# Patient Record
Sex: FEMALE | Race: BLACK OR AFRICAN AMERICAN
[De-identification: names, ages, dates, MRNs, and addresses within clinical notes are randomized per-mention and may not be internally consistent; named-entity substitution may affect disease eponyms.]

---

## 2017-07-04 ENCOUNTER — HOSPITAL ENCOUNTER (EMERGENCY)
Dept: HOSPITAL 10 - FTE | Age: 67
Discharge: HOME | End: 2017-07-04
Payer: COMMERCIAL

## 2017-07-04 VITALS
HEIGHT: 65 IN | BODY MASS INDEX: 24.98 KG/M2 | HEIGHT: 65 IN | BODY MASS INDEX: 24.98 KG/M2 | WEIGHT: 149.91 LBS | WEIGHT: 149.91 LBS

## 2017-07-04 DIAGNOSIS — I10: ICD-10-CM

## 2017-07-04 DIAGNOSIS — H10.9: Primary | ICD-10-CM

## 2017-07-04 PROCEDURE — 99283 EMERGENCY DEPT VISIT LOW MDM: CPT

## 2017-07-04 NOTE — ERD
ER Documentation


Chief Complaint


Date/Time


DATE: 7/4/17 


TIME: 08:54


Chief Complaint


rt eye reddness and discharge since this am





HPI


66-year-old female complaining of right eye redness and discharge since this 

morning.  Patient reports her right eye was glued shut this morning upon awake.

  Patient reports a brief episode of slight high pain last night.  Patient 

stated that she has history of glaucoma, and have occasional eye pains 

bilaterally.  The eye pain history is similar to previous eye pains.  She uses 

Lumigan and Cosopt drops for her glaucoma, and had not used them this morning.  

Patient also has history of hypertension, acid reflux, overactive bladder 

syndrome, and allergic rhinitis.  Denies eye pain at this time.  Denies 

photophobia.  Denies nausea or vomiting.  Denies blurry vision.





ROS


All systems reviewed and are negative except as per history of present illness.





Medications


Home Meds


Active Scripts


Polymyxin B Sulfate-TMP* (Polymyxin B-TMP Eye Drops*) 10 Ml Drops, 1 DROP RIGHT 

EYE QID for 7 Days, EA


   Prov:SHLOMO KAUR NP         7/4/17





Allergies


Allergies:  


Coded Allergies:  


     No Known Allergy (Unverified , 7/4/17)





PMhx/Soc


History of Surgery:  Yes (b/l eye surgery )


Anesthesia Reaction:  No


Hx Neurological Disorder:  No


Hx Respiratory Disorders:  No


Hx Cardiac Disorders:  Yes (htn)


Hx Psychiatric Problems:  No


Hx Miscellaneous Medical Probl:  Yes (glaucoma )


Hx Alcohol Use:  No


Hx Substance Use:  No


Hx Tobacco Use:  No


Smoking Status:  Never smoker





Physical Exam


Vitals





Vital Signs








  Date Time  Temp Pulse Resp B/P Pulse Ox O2 Delivery O2 Flow Rate FiO2


 


7/4/17 07:20 98.2 62 18 137/70 99   








Physical Exam


General:    Well-developed, well-nourished, conscious and coherent, in no 

distress


Skin:    Warm and dry without rash, good texture and turgor


Head:    Normocephalic without evidence of trauma


Eyes:    Sclera normal, right conjunctiva injected, with moderate discharge; 

pupils equal, round, and reactive to light; extraocular movements are intact


Neck:    Supple without meningismus or adenopathy.  Carotids are equal.  

Trachea midline.  No bruits or JVD


Chest:    Normal AP diameter.  Good expansion without retractions.  Nontender.  

Lungs are clear to auscultate bilaterally with good tidal volume


Heart:    Regular rate and rhythm.  No murmur, rub, or gallops heard


Extremities:    Full range of motion.  Good strength bilaterally.  No clubbing, 

cyanosis, or edema. Peripheral pulses are intact. Sensation intact


Neuro:    Alert and oriented 4, GCS 15.  Cranial nerves grossly intact.  Motor 

and sensory exams nonfocal.  Moves all extremities.  Speech clear.  Gait normal





Procedures/MDM


Well-appearing 66-year-old female with history of alcohol, presented to ED with 

right eye redness 1 day.





Visual acuity, corrected with glasses: Left 20/25, right 20/25, bilateral 20/20.


Zeb-Pen: Left 24, right 29.





Patient is history exam findings are consistent with acute conjunctivitis.  Her 

eye pressures are elevated, likely due to pre-existing glaucoma.  She did not 

take her medications this morning.  Low suspicion for acute angle-closure 

glaucoma.  I doubt retinal detachment, retinal artery occlusion, optic neuritis

, periorbital or orbital cellulitis.





Patient appears well, stable for discharge and outpatient management. Medical 

decision making shared with patient and family. Education provided to patient 

and family. Patient and family expressed understanding of the plan.





Medications on discharge: Polytrim ophthalmic.


Follow-up: Ophthalmologist if no improvement or worse





Departure


Diagnosis:  


 Primary Impression:  


 Conjunctivitis


Condition:  Stable


Patient Instructions:  Conjunctivitis, Bacterial


Referrals:  


COMM[facility]


YOU HAVE RECEIVED A MEDICAL SCREENING EXAM AND THE RESULTS INDICATE THAT YOU DO 

NOT HAVE A CONDITION THAT REQUIRES URGENT TREATMENT IN THE EMERGENCY DEPARTMENT.





FURTHER EVALUATION AND TREATMENT OF YOUR CONDITION CAN WAIT UNTIL YOU ARE SEEN 

IN YOUR DOCTORS OFFICE WITHIN THE NEXT 1-2 DAYS. IT IS YOUR RESPONSIBILITY TO 

MAKE AN APPOINTMENT FOR FOLOW-UP CARE.





IF YOU HAVE A PRIMARY DOCTOR


--you should call your primary doctor and schedule an appointment





IF YOU DO NOT HAVE A PRIMARY DOCTOR YOU CAN CALL OUR PHYSICIAN REFERRAL HOTLINE 

AT


 [phone] 





IF YOU CAN NOT AFFORD TO SEE A PHYSICIAN YOU CAN CHOSE FROM THE FOLLOWING 

[facility] CL[facility]





[facility] [phone] [address] [facility] [phone] [address] [facility]. [facility] [phone] [address] [facility] [phone] [address] [facility] [phone] [address] [facility]. [facility] [phone] [address] [facility] EYE [place]


Hours: Mon - Fri


9:00 AM - 5:00 PM





Additional Instructions:  


Follow up with your ophthalmologist if no improvement or worse.











SHLOMO KAUR NP Jul 4, 2017 09:05

## 2018-03-21 ENCOUNTER — HOSPITAL ENCOUNTER (OUTPATIENT)
Dept: HOSPITAL 91 - C/S | Age: 68
Discharge: HOME | End: 2018-03-21
Payer: MEDICARE

## 2018-03-21 ENCOUNTER — HOSPITAL ENCOUNTER (OUTPATIENT)
Age: 68
Discharge: HOME | End: 2018-03-21

## 2018-03-21 DIAGNOSIS — I69.911: Primary | ICD-10-CM

## 2018-03-21 DIAGNOSIS — G31.84: ICD-10-CM

## 2018-03-21 PROCEDURE — 70450 CT HEAD/BRAIN W/O DYE: CPT

## 2018-12-27 ENCOUNTER — HOSPITAL ENCOUNTER (OUTPATIENT)
Dept: HOSPITAL 91 - LAB | Age: 68
Discharge: HOME | End: 2018-12-27
Payer: MEDICARE

## 2018-12-27 ENCOUNTER — HOSPITAL ENCOUNTER (OUTPATIENT)
Age: 68
Discharge: HOME | End: 2018-12-27

## 2018-12-27 DIAGNOSIS — R05: Primary | ICD-10-CM

## 2018-12-27 PROCEDURE — 71046 X-RAY EXAM CHEST 2 VIEWS: CPT
